# Patient Record
(demographics unavailable — no encounter records)

---

## 2025-02-07 NOTE — HISTORY OF PRESENT ILLNESS
[FreeTextEntry1] : The patient is 79 y/o man with PMH of PAD, and recently diagnosed ALS, who is here for evaluation of L foot osteomyelitis. Patient has history of L foot big toe amputation from diabetic foot wound. He was admitted to [facility] on 12/27 with left foot second toe wound. MRI confirmed osteomyelitis. He was on IV antibiotics initially. Patient and wife initially agreeable with amputation of the toe but later patient and family changed decision regarding surgery They agreed on discharge to [facility] with oral antibiotics to complete 6 weeks total per ID recs. He was sent on PO Augmentin and doxycycline. Today, the patient is here with his care taker, spouse Lazara. Today is the last day of abx. The wife says that they wanted to make sure if it is okay to stop antibiotics. No fever or chills reported. Patient has no pain at the toe. He is being seen by vascular surgery as well. He is being evaluated by a podiatrist too.

## 2025-02-07 NOTE — PHYSICAL EXAM
[General Appearance - In No Acute Distress] : in no acute distress [] : no respiratory distress [Respiration, Rhythm And Depth] : normal respiratory rhythm and effort [Auscultation Breath Sounds / Voice Sounds] : lungs were clear to auscultation bilaterally [Heart Rate And Rhythm] : heart rate was normal and rhythm regular [Edema] : there was no peripheral edema [Abdomen Soft] : soft [Abdomen Tenderness] : non-tender [Skin Color & Pigmentation] : normal skin color and pigmentation [Oriented To Time, Place, And Person] : oriented to person, place, and time [FreeTextEntry1] : He has weakness of bilateral LE

## 2025-02-07 NOTE — DATA REVIEWED
[No studies available for review at this time.] : No studies available for review at this time. [FreeTextEntry1] : Hospital discharge documents reviewed  MR FOOT WAW IC LT   ORDERED BY: GINA MCKEON PROCEDURE DATE:  12/31/2024 INTERPRETATION:  MRI of left foot with and without contrast TECHNIQUE: Multiplanar multisequence MRI of the left foot with and without contrast. Contrast: Gadavist. Administered: 7.5 cc. Discarded: 0 cc. INDICATION: Concern for infection. COMPARISON: Radiograph of the left foot performed 12/27/2024. MRI of the left foot performed 2/7/2022. FINDINGS: Transmetatarsal amputation at the first metatarsal. Deformity of the second toe with medial angulation at the PIP joint of the mid and distal phalanx. Severe osteoarthrosis at the second MTP joint. There is high STIR weighted signal and intermediate to low T1-weighted signal at the distal phalanx, middle phalanx, and distal diaphysis of the proximal phalanx of the second toe with the cortex at the distal phalanx being imperceptible. There is adjacent subcutaneous edema. There is no drainable fluid collection. No definitive soft tissue ulceration is identified. Mild intramuscular edema at the plantar surface of the foot. There is no tendon tear, tendinosis, tenosynovitis. Negative for fracture. IMPRESSION: MRI of the left foot demonstrates high STIR weighted signal and intermediate to low T1-weighted signal within the second toe distal phalanx, middle phalanx, and distal diaphysis of the proximal phalanx with imperceptible cortex at the distal phalanx. Findings are felt to reflect osteomyelitis. No definitive soft tissue ulceration is seen for which correlation with physical exam is recommended.  --- End of Report ---

## 2025-02-07 NOTE — REVIEW OF SYSTEMS
[Skin Lesions] : skin lesion [Negative] : ENT [Fever] : no fever [Chills] : no chills [Abdominal Pain] : no abdominal pain [Diarrhea] : no diarrhea [Joint Pain] : no joint pain

## 2025-02-07 NOTE — ASSESSMENT
[Risk Reduction] : risk reduction [FreeTextEntry1] : Left foot osteomyelitis: Completed 6 weeks in total of antibiotics per discharge instructions No additional antibiotics indicated Continue foot care Podiatry follow up Counseled on signs of infection RTC as needed